# Patient Record
Sex: FEMALE | Race: OTHER | Employment: FULL TIME | URBAN - METROPOLITAN AREA
[De-identification: names, ages, dates, MRNs, and addresses within clinical notes are randomized per-mention and may not be internally consistent; named-entity substitution may affect disease eponyms.]

---

## 2023-05-15 SDOH — HEALTH STABILITY: PHYSICAL HEALTH: ON AVERAGE, HOW MANY MINUTES DO YOU ENGAGE IN EXERCISE AT THIS LEVEL?: 20 MIN

## 2023-05-15 SDOH — HEALTH STABILITY: PHYSICAL HEALTH: ON AVERAGE, HOW MANY DAYS PER WEEK DO YOU ENGAGE IN MODERATE TO STRENUOUS EXERCISE (LIKE A BRISK WALK)?: 3 DAYS

## 2023-05-15 ASSESSMENT — SOCIAL DETERMINANTS OF HEALTH (SDOH)
WITHIN THE LAST YEAR, HAVE YOU BEEN KICKED, HIT, SLAPPED, OR OTHERWISE PHYSICALLY HURT BY YOUR PARTNER OR EX-PARTNER?: NO
WITHIN THE LAST YEAR, HAVE YOU BEEN AFRAID OF YOUR PARTNER OR EX-PARTNER?: NO
WITHIN THE LAST YEAR, HAVE YOU BEEN HUMILIATED OR EMOTIONALLY ABUSED IN OTHER WAYS BY YOUR PARTNER OR EX-PARTNER?: NO
WITHIN THE LAST YEAR, HAVE TO BEEN RAPED OR FORCED TO HAVE ANY KIND OF SEXUAL ACTIVITY BY YOUR PARTNER OR EX-PARTNER?: NO

## 2023-05-16 ENCOUNTER — OFFICE VISIT (OUTPATIENT)
Age: 48
End: 2023-05-16

## 2023-05-16 VITALS — WEIGHT: 198.8 LBS | BODY MASS INDEX: 39.03 KG/M2 | HEIGHT: 60 IN

## 2023-05-16 DIAGNOSIS — M75.02 ADHESIVE CAPSULITIS OF LEFT SHOULDER: Primary | ICD-10-CM

## 2023-05-16 DIAGNOSIS — G89.29 CHRONIC LEFT SHOULDER PAIN: ICD-10-CM

## 2023-05-16 DIAGNOSIS — M25.512 CHRONIC LEFT SHOULDER PAIN: ICD-10-CM

## 2023-05-16 RX ORDER — PRAZOSIN HYDROCHLORIDE 1 MG/1
3 CAPSULE ORAL
COMMUNITY
Start: 2023-04-21

## 2023-05-16 RX ORDER — GABAPENTIN 300 MG/1
300 CAPSULE ORAL
COMMUNITY
Start: 2023-05-12

## 2023-05-16 RX ORDER — TOPIRAMATE 50 MG/1
50 TABLET, FILM COATED ORAL
COMMUNITY
Start: 2022-08-10

## 2023-05-16 RX ORDER — ATORVASTATIN CALCIUM 40 MG/1
40 TABLET, FILM COATED ORAL
COMMUNITY
Start: 2023-04-18

## 2023-05-16 RX ORDER — AZELASTINE 1 MG/ML
SPRAY, METERED NASAL
COMMUNITY
Start: 2022-04-22

## 2023-05-16 RX ORDER — CENEGERMIN-BKBJ 20 UG/ML
SOLUTION/ DROPS OPHTHALMIC
COMMUNITY
Start: 2022-02-28

## 2023-05-16 RX ORDER — POLYETHYLENE GLYCOL 3350 17 G/17G
POWDER, FOR SOLUTION ORAL
COMMUNITY
Start: 2023-04-18

## 2023-05-16 RX ORDER — CARBOXYMETHYLCELLULOSE SODIUM 10 MG/ML
GEL OPHTHALMIC
COMMUNITY
Start: 2022-11-03 | End: 2023-11-04

## 2023-05-16 RX ORDER — ASPIRIN 81 MG/1
81 TABLET, CHEWABLE ORAL
COMMUNITY
Start: 2023-04-18

## 2023-05-16 RX ORDER — HYDROXYZINE HYDROCHLORIDE 25 MG/1
25 TABLET, FILM COATED ORAL
COMMUNITY
Start: 2022-10-21

## 2023-05-16 RX ORDER — DULOXETIN HYDROCHLORIDE 60 MG/1
60 CAPSULE, DELAYED RELEASE ORAL
COMMUNITY
Start: 2022-09-28

## 2023-05-16 RX ORDER — TRAZODONE HYDROCHLORIDE 100 MG/1
100 TABLET ORAL
COMMUNITY
Start: 2023-04-21

## 2023-05-16 RX ORDER — MELOXICAM 15 MG/1
TABLET ORAL
COMMUNITY
Start: 2022-08-11 | End: 2023-08-12

## 2023-05-16 RX ORDER — CLOTRIMAZOLE 1 %
CREAM (GRAM) TOPICAL
COMMUNITY
Start: 2022-10-21

## 2023-05-16 RX ORDER — MONTELUKAST SODIUM 10 MG/1
1 TABLET ORAL DAILY
COMMUNITY
Start: 2023-04-18

## 2023-05-16 NOTE — PROGRESS NOTES
Michelle Shah  1975   Chief Complaint   Patient presents with    Shoulder Pain     Lt         HISTORY OF PRESENT ILLNESS  Michelle Shah is a 52 y.o. female who presents today for evaluation of left shoulder pain. Pain is a 8/10. Pain has been present for awhile, for 20 years. She was in the [facility] and injured the shoulder while repelling years ago. She tried attending 3 rounds of PT, PRP injections and cortisone injections without relief. No previous surgeries on the left shoulder. Presents today ambulating with a cane. Has pain with reaching, with certain movements. Cannot lay on her left side. She also notes numbness in her arm and neck. Has tried following treatments: Injections:Yes; Brace:No; Therapy:Yes; Cane/Crutch:Yes      Allergies   Allergen Reactions    Latex     Amoxicillin Anaphylaxis, Rash, Shortness Of Breath and Swelling     Reaction(s): Anaphylaxis, Willoughby-Luan Syndrome; Note: REACTION UPON INGESTION      Fluogen [Influenza Virus Vaccine]      Reaction(s): Fever, Urticaria, Vomiting, Other: EXEMPT FROM FLU VACCINE      Influenza Vaccine Live      Reaction(s): Angioedema, Urticaria      Naproxen Diarrhea and Nausea And Vomiting        No past medical history on file. Social History    None        No past surgical history on file. No family history on file.   Current Outpatient Medications   Medication Sig    aspirin 81 MG chewable tablet 1 tablet    atorvastatin (LIPITOR) 40 MG tablet 1 tablet    azelastine (ASTELIN) 0.1 % nasal spray     betamethasone valerate (VALISONE) 0.1 % ointment APPLY A SMALL AMOUNT TO AFFECTED AREA 2 TIMES A DAY AS NEEDED FOR RASH ON LEGS, ARM, OR TORSO; NO FACE USE    carboxymethylcellulose (THERATEARS) 1 % ophthalmic gel APPLY 1 DROP TO BOTH EYES AT BEDTIME AS NEEDED FOR DRY EYES    Cenegermin-bkbj (OXERVATE) 0.002 % SOLN     clotrimazole (LOTRIMIN) 1 % cream APPLY A SMALL AMOUNT TO AFFECTED AREA 2 TIMES A DAY FUNGAL RASH UNDER BREASTS

## 2023-05-17 SDOH — HEALTH STABILITY: PHYSICAL HEALTH: ON AVERAGE, HOW MANY MINUTES DO YOU ENGAGE IN EXERCISE AT THIS LEVEL?: 20 MIN

## 2023-05-17 SDOH — HEALTH STABILITY: PHYSICAL HEALTH: ON AVERAGE, HOW MANY DAYS PER WEEK DO YOU ENGAGE IN MODERATE TO STRENUOUS EXERCISE (LIKE A BRISK WALK)?: 3 DAYS

## 2023-05-19 ENCOUNTER — OFFICE VISIT (OUTPATIENT)
Age: 48
End: 2023-05-19

## 2023-05-19 ENCOUNTER — HOSPITAL ENCOUNTER (OUTPATIENT)
Facility: HOSPITAL | Age: 48
Discharge: HOME | End: 2023-05-19
Payer: OTHER GOVERNMENT

## 2023-05-19 VITALS — BODY MASS INDEX: 38.87 KG/M2 | HEIGHT: 60 IN | WEIGHT: 198 LBS

## 2023-05-19 DIAGNOSIS — M25.561 RIGHT KNEE PAIN, UNSPECIFIED CHRONICITY: ICD-10-CM

## 2023-05-19 DIAGNOSIS — M17.11 UNILATERAL PRIMARY OSTEOARTHRITIS, RIGHT KNEE: ICD-10-CM

## 2023-05-19 DIAGNOSIS — M25.562 LEFT KNEE PAIN, UNSPECIFIED CHRONICITY: Primary | ICD-10-CM

## 2023-05-19 DIAGNOSIS — M17.12 UNILATERAL PRIMARY OSTEOARTHRITIS, LEFT KNEE: ICD-10-CM

## 2023-05-19 LAB
BASOPHILS # BLD: 0 K/UL (ref 0–0.1)
BASOPHILS NFR BLD: 1 % (ref 0–2)
CRP SERPL-MCNC: <0.3 MG/DL (ref 0–0.3)
DIFFERENTIAL METHOD BLD: NORMAL
EOSINOPHIL # BLD: 0.1 K/UL (ref 0–0.4)
EOSINOPHIL NFR BLD: 1 % (ref 0–5)
ERYTHROCYTE [DISTWIDTH] IN BLOOD BY AUTOMATED COUNT: 13.6 % (ref 11.6–14.5)
ERYTHROCYTE [SEDIMENTATION RATE] IN BLOOD: 22 MM/HR (ref 0–20)
HCT VFR BLD AUTO: 42.1 % (ref 35–45)
HGB BLD-MCNC: 13.5 G/DL (ref 12–16)
IMM GRANULOCYTES # BLD AUTO: 0 K/UL (ref 0–0.04)
IMM GRANULOCYTES NFR BLD AUTO: 0 % (ref 0–0.5)
LYMPHOCYTES # BLD: 2.4 K/UL (ref 0.9–3.6)
LYMPHOCYTES NFR BLD: 28 % (ref 21–52)
MCH RBC QN AUTO: 29.1 PG (ref 24–34)
MCHC RBC AUTO-ENTMCNC: 32.1 G/DL (ref 31–37)
MCV RBC AUTO: 90.7 FL (ref 78–100)
MONOCYTES # BLD: 0.6 K/UL (ref 0.05–1.2)
MONOCYTES NFR BLD: 7 % (ref 3–10)
NEUTS SEG # BLD: 5.3 K/UL (ref 1.8–8)
NEUTS SEG NFR BLD: 63 % (ref 40–73)
NRBC # BLD: 0 K/UL (ref 0–0.01)
NRBC BLD-RTO: 0 PER 100 WBC
PLATELET # BLD AUTO: 285 K/UL (ref 135–420)
PMV BLD AUTO: 11.6 FL (ref 9.2–11.8)
RBC # BLD AUTO: 4.64 M/UL (ref 4.2–5.3)
RHEUMATOID FACT SERPL-ACNC: <10 IU/ML
URATE SERPL-MCNC: 5.1 MG/DL (ref 2.6–7.2)
WBC # BLD AUTO: 8.4 K/UL (ref 4.6–13.2)

## 2023-05-19 PROCEDURE — 86225 DNA ANTIBODY NATIVE: CPT

## 2023-05-19 PROCEDURE — 85652 RBC SED RATE AUTOMATED: CPT

## 2023-05-19 PROCEDURE — 86235 NUCLEAR ANTIGEN ANTIBODY: CPT

## 2023-05-19 PROCEDURE — 84550 ASSAY OF BLOOD/URIC ACID: CPT

## 2023-05-19 PROCEDURE — 86140 C-REACTIVE PROTEIN: CPT

## 2023-05-19 PROCEDURE — 86431 RHEUMATOID FACTOR QUANT: CPT

## 2023-05-19 PROCEDURE — 85025 COMPLETE CBC W/AUTO DIFF WBC: CPT

## 2023-05-19 PROCEDURE — 86617 LYME DISEASE ANTIBODY: CPT

## 2023-05-19 PROCEDURE — 36415 COLL VENOUS BLD VENIPUNCTURE: CPT

## 2023-05-19 NOTE — PROGRESS NOTES
Stress: Not on file   Social Connections: Not on file   Intimate Partner Violence: Not At Risk    Fear of Current or Ex-Partner: No    Emotionally Abused: No    Physically Abused: No    Sexually Abused: No   Housing Stability: Not on file       History reviewed. No pertinent surgical history. Patient seen evaluated today for pain advice regarding bilateral knee pain. She reports about a 10-year history of knee pain. 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 She does have trouble getting from a chair. She has trouble stairs. She is unable to kneel. She has decreased walking tolerance. She does have episodes where the knee wants to give way on her bilaterally. She takes Mobic on a daily basis. She does have some back problems and takes Neurontin for this. Patient denies recent fevers, chills, chest pain, SOB, or injuries. No recent systemic changes noted. A 12-point review of systems is performed today. Pertinent positives are noted. All other systems reviewed and otherwise are negative. Physical exam: General: Alert and oriented x3, nad.  well-developed, well nourished. normal affect, AF. NC/AT, EOMI, neck supple, trachea midline, no JVD present. Breathing is non-labored. Examination of the lower extremities reveals pain-free range of motion of the hips. There is no pain to palpation the trochanter bursa. Negative straight leg raise. Negative calf tenderness. Negative Homans. No signs of DVT present. Each of the knees reveal skin intact. There is no erythema or ecchymosis noted. She has minimal effusion with negative patella ballottement. She does have pain to palpation tricompartmentally about the knees and crepitus arising into compartment. She does have a click medially bilaterally with nAjelica's. Ligamentously stable.     Radiographs obtained in office today 5/19/2023 at the [facility] location including AP, tunnel, lateral, skyline

## 2023-05-20 LAB
CENTROMERE B AB SER-ACNC: <0.2 AI (ref 0–0.9)
CHROMATIN AB SERPL-ACNC: <0.2 AI (ref 0–0.9)
DSDNA AB SER-ACNC: <1 IU/ML (ref 0–9)
ENA JO1 AB SER-ACNC: <0.2 AI (ref 0–0.9)
ENA RNP AB SER-ACNC: <0.2 AI (ref 0–0.9)
ENA SCL70 AB SER-ACNC: <0.2 AI (ref 0–0.9)
ENA SM AB SER-ACNC: <0.2 AI (ref 0–0.9)
ENA SS-A AB SER-ACNC: <0.2 AI (ref 0–0.9)
ENA SS-B AB SER-ACNC: <0.2 AI (ref 0–0.9)
Lab: NORMAL

## 2023-05-22 ENCOUNTER — TELEPHONE (OUTPATIENT)
Age: 48
End: 2023-05-22

## 2023-05-23 LAB
B BURGDOR IGG PATRN SER IB-IMP: NEGATIVE
B BURGDOR IGM PATRN SER IB-IMP: NEGATIVE
B BURGDOR18KD IGG SER QL IB: ABNORMAL
B BURGDOR23KD IGG SER QL IB: ABNORMAL
B BURGDOR23KD IGM SER QL IB: ABNORMAL
B BURGDOR28KD IGG SER QL IB: ABNORMAL
B BURGDOR30KD IGG SER QL IB: ABNORMAL
B BURGDOR39KD IGG SER QL IB: ABNORMAL
B BURGDOR39KD IGM SER QL IB: ABNORMAL
B BURGDOR41KD IGG SER QL IB: PRESENT
B BURGDOR41KD IGM SER QL IB: ABNORMAL
B BURGDOR45KD IGG SER QL IB: ABNORMAL
B BURGDOR58KD IGG SER QL IB: ABNORMAL
B BURGDOR66KD IGG SER QL IB: ABNORMAL
B BURGDOR93KD IGG SER QL IB: ABNORMAL

## 2023-06-02 ENCOUNTER — HOSPITAL ENCOUNTER (OUTPATIENT)
Facility: HOSPITAL | Age: 48
Discharge: HOME | End: 2023-06-02
Payer: OTHER GOVERNMENT

## 2023-06-02 ENCOUNTER — HOSPITAL ENCOUNTER (OUTPATIENT)
Facility: HOSPITAL | Age: 48
End: 2023-06-02
Payer: OTHER GOVERNMENT

## 2023-06-02 DIAGNOSIS — M25.561 RIGHT KNEE PAIN, UNSPECIFIED CHRONICITY: ICD-10-CM

## 2023-06-02 DIAGNOSIS — M25.562 LEFT KNEE PAIN, UNSPECIFIED CHRONICITY: ICD-10-CM

## 2023-06-02 PROCEDURE — 73721 MRI JNT OF LWR EXTRE W/O DYE: CPT

## 2023-06-08 ENCOUNTER — OFFICE VISIT (OUTPATIENT)
Age: 48
End: 2023-06-08

## 2023-06-08 VITALS — HEIGHT: 60 IN | RESPIRATION RATE: 18 BRPM | WEIGHT: 198 LBS | BODY MASS INDEX: 38.87 KG/M2

## 2023-06-08 DIAGNOSIS — M25.362 KNEE GIVES WAY, LEFT: ICD-10-CM

## 2023-06-08 DIAGNOSIS — M17.12 UNILATERAL PRIMARY OSTEOARTHRITIS, LEFT KNEE: ICD-10-CM

## 2023-06-08 DIAGNOSIS — M25.361 KNEE GIVES WAY, RIGHT: ICD-10-CM

## 2023-06-08 DIAGNOSIS — M17.11 UNILATERAL PRIMARY OSTEOARTHRITIS, RIGHT KNEE: Primary | ICD-10-CM

## 2023-06-08 DIAGNOSIS — M25.562 PAIN IN BOTH KNEES, UNSPECIFIED CHRONICITY: ICD-10-CM

## 2023-06-08 DIAGNOSIS — M25.561 PAIN IN BOTH KNEES, UNSPECIFIED CHRONICITY: ICD-10-CM

## 2023-06-08 NOTE — PROGRESS NOTES
mechanical symptomatology and pain I will refer her over the sports medicine gas for further evaluation to see if arthroscopy would be beneficial for her. She will continue activities as tolerated. She will call with any questions or concerns that shall arise.             JR Christian LEONG, PAJorgeC, ATC

## 2023-07-07 ENCOUNTER — TELEPHONE (OUTPATIENT)
Age: 48
End: 2023-07-07

## 2023-07-07 NOTE — TELEPHONE ENCOUNTER
Call received from Frank Maloney at [facility] stating the patient ABBOTT [facility]) was referred to them, and authorization/referral is needed before the patient can be seen. Frank Maloney is asking for a call back, and can be reached at [phone].

## 2023-07-11 ENCOUNTER — TELEPHONE (OUTPATIENT)
Age: 48
End: 2023-07-11

## 2023-07-11 NOTE — TELEPHONE ENCOUNTER
Patient called and said that Emma Moctezuma referred her to [facility] in [facility] for her Left Shoulder. Patient said she called [facility] and they told her that they have a closer location for her that she can go to. They told her that she could go to the [facility] in [place]. Patient said she went to the [facility] in [place]. That they saw her for left shoulder. Patient said that afterwards she found out that her insurance , [employer] will not pay for the visit to [facility] , due to [facility] office never asked the [address] [place] [address] for their approval.     Patient said that [facility] office can not refer the patient to anyone else without their approval first. That the [address] [place] [address] will not pay for the [facility] appt she had. So she is now stuck with the bill. Patient said that she wants to continue to get treated for her left shoulder at [facility], but she needs  office to get approval from [facility],so that she may continue going to [facility] in [place]. Patient said that [facility] office needs to place a request for her to see Ron Coyne at [facility] in [place]. [phone]. Patient is upset that [facility] office did not get the approval first.    Patient tel. [phone].

## 2023-07-14 ENCOUNTER — OFFICE VISIT (OUTPATIENT)
Age: 48
End: 2023-07-14

## 2023-07-14 VITALS — BODY MASS INDEX: 39.5 KG/M2 | WEIGHT: 201.2 LBS | HEIGHT: 60 IN

## 2023-07-14 DIAGNOSIS — M23.92 INTERNAL DERANGEMENT OF LEFT KNEE: Primary | ICD-10-CM

## 2023-07-14 DIAGNOSIS — E66.9 CLASS 2 OBESITY WITH BODY MASS INDEX (BMI) OF 39.0 TO 39.9 IN ADULT, UNSPECIFIED OBESITY TYPE, UNSPECIFIED WHETHER SERIOUS COMORBIDITY PRESENT: ICD-10-CM

## 2023-07-14 DIAGNOSIS — M23.91 INTERNAL DERANGEMENT OF RIGHT KNEE: ICD-10-CM

## 2023-07-14 PROBLEM — E66.812 CLASS 2 OBESITY WITH BODY MASS INDEX (BMI) OF 39.0 TO 39.9 IN ADULT: Status: ACTIVE | Noted: 2023-07-14

## 2023-07-14 RX ORDER — TRIAMCINOLONE ACETONIDE 40 MG/ML
80 INJECTION, SUSPENSION INTRA-ARTICULAR; INTRAMUSCULAR ONCE
Status: COMPLETED | OUTPATIENT
Start: 2023-07-14 | End: 2023-07-14

## 2023-07-14 RX ADMIN — TRIAMCINOLONE ACETONIDE 80 MG: 40 INJECTION, SUSPENSION INTRA-ARTICULAR; INTRAMUSCULAR at 09:54

## 2023-07-14 NOTE — PROGRESS NOTES
TIMES A DAY ** PLEASE USE DICLOFENAC DOSING CARD FOR ADMINISTRATION** TO PAINFUL JOINT/BACK      DULoxetine (CYMBALTA) 60 MG extended release capsule 1 capsule      gabapentin (NEURONTIN) 300 MG capsule 1 capsule. hydrOXYzine HCl (ATARAX) 25 MG tablet 1 tablet      meloxicam (MOBIC) 15 MG tablet TAKE ONE TABLET BY MOUTH EVERY DAY AS NEEDED FOR PAIN      montelukast (SINGULAIR) 10 MG tablet Take 1 tablet by mouth daily      polyethylene glycol (GLYCOLAX) 17 GM/SCOOP powder TAKE 1 CAPFUL (17 GRAMS) BY MOUTH EVERY DAY -DISSOLVE EACH DOSE IN AT LEAST 8 OUNCES OF WATER, JUICE, SODA OR COFFEE      Polyvinyl Alcohol-Povidone PF (REFRESH) 1.4-0.6 % SOLN ophthalmic solution INSTILL 2 DROPS IN BOTH EYES SIX TIMES A DAY FOR DRY EYES      prazosin (MINIPRESS) 1 MG capsule 3 capsules      topiramate (TOPAMAX) 50 MG tablet 1 tablet      traZODone (DESYREL) 100 MG tablet 1 tablet       Current Facility-Administered Medications   Medication Dose Route Frequency Provider Last Rate Last Admin    triamcinolone acetonide (KENALOG-40) injection 80 mg  80 mg IntraMUSCular Once Tai Clay DO        triamcinolone acetonide (KENALOG-40) injection 80 mg  80 mg IntraMUSCular Once Speedy Tarango,             Allergies   Allergen Reactions    Latex     Amoxicillin Anaphylaxis, Rash, Shortness Of Breath and Swelling     Reaction(s): Anaphylaxis, Willoughby-Luan Syndrome; Note: REACTION UPON INGESTION      Fluogen [Influenza Virus Vaccine]      Reaction(s): Fever, Urticaria, Vomiting, Other: EXEMPT FROM FLU VACCINE      Influenza Vaccine Live      Reaction(s): Angioedema, Urticaria      Naproxen Diarrhea and Nausea And Vomiting       No past surgical history on file.     Social History     Socioeconomic History    Marital status:      Spouse name: Not on file    Number of children: Not on file    Years of education: Not on file    Highest education level: Not on file   Occupational History    Not on file   Tobacco Use

## 2023-07-14 NOTE — ASSESSMENT & PLAN NOTE
49-year-old female with bilateral knee pain. This has been present for many years. She also has bilateral hip pain as well as back pain as well as sciatica. She has had injections into her back which has given transient relief of her back and sciatic symptoms but has not helped her hip or knees. She has had bursa injections in her hips which did give her help. None of these is helped her knee pain. Imaging does not show advanced arthritis on the x-ray or MRI. There is some signal in the medial menisci but no distinct tearing, patient denies any locking or catching. She has been tested for rheumatoid arthritis and lupus and all that testing is negative. On physical exam there is minimal crepitus behind the kneecaps and no J sign bilaterally. She has excellent mobility. She is tried multiple medications including Cymbalta, Mobic, Tylenol, Voltaren gel, gabapentin, tramadol without much relief. She is also going 6 weeks of physical therapy without relief. At this point I am not entirely sure what is exactly causing her so much pain. I will try bilateral cortisone injections into the knees today to help diagnose that her pain is intra-articular or if this referred type pain. If those help we can discuss repeating them in the future or moving onto gel injections. If none of these work may consider diagnostic arthroscopy. After obtaining written consent for bilateral knee intra-articular injection the patient was prepped in normal sterile fashion with freeze spray and alcohol. 80mg kenalog and 2mL 2% lidocaine was injected . The needle was withdrawn, the area was cleansed and a sterile bandage was applied. The patient tolerated the procedure well.

## 2023-09-12 ENCOUNTER — OFFICE VISIT (OUTPATIENT)
Age: 48
End: 2023-09-12
Payer: OTHER GOVERNMENT

## 2023-09-12 VITALS — BODY MASS INDEX: 40.05 KG/M2 | WEIGHT: 204 LBS | HEIGHT: 60 IN

## 2023-09-12 DIAGNOSIS — E66.9 CLASS 2 OBESITY WITH BODY MASS INDEX (BMI) OF 39.0 TO 39.9 IN ADULT, UNSPECIFIED OBESITY TYPE, UNSPECIFIED WHETHER SERIOUS COMORBIDITY PRESENT: ICD-10-CM

## 2023-09-12 DIAGNOSIS — M23.92 INTERNAL DERANGEMENT OF LEFT KNEE: Primary | ICD-10-CM

## 2023-09-12 DIAGNOSIS — M23.91 INTERNAL DERANGEMENT OF RIGHT KNEE: ICD-10-CM

## 2023-09-12 PROCEDURE — 99213 OFFICE O/P EST LOW 20 MIN: CPT | Performed by: ORTHOPAEDIC SURGERY

## 2023-09-12 NOTE — PROGRESS NOTES
LEGS, ARM, OR TORSO; NO FACE USE      carboxymethylcellulose (THERATEARS) 1 % ophthalmic gel APPLY 1 DROP TO BOTH EYES AT BEDTIME AS NEEDED FOR DRY EYES      Cenegermin-bkbj (OXERVATE) 0.002 % SOLN       clotrimazole (LOTRIMIN) 1 % cream APPLY A SMALL AMOUNT TO AFFECTED AREA 2 TIMES A DAY FUNGAL RASH UNDER BREASTS      diclofenac sodium (VOLTAREN) 1 % GEL APPLY FOUR GRAMS TO INTACT SKIN OF PAINFUL AFFECTED AREA FOUR TIMES A DAY ** PLEASE USE DICLOFENAC DOSING CARD FOR ADMINISTRATION** TO PAINFUL JOINT/BACK      DULoxetine (CYMBALTA) 60 MG extended release capsule 1 capsule      gabapentin (NEURONTIN) 300 MG capsule 1 capsule. hydrOXYzine HCl (ATARAX) 25 MG tablet 1 tablet      montelukast (SINGULAIR) 10 MG tablet Take 1 tablet by mouth daily      polyethylene glycol (GLYCOLAX) 17 GM/SCOOP powder TAKE 1 CAPFUL (17 GRAMS) BY MOUTH EVERY DAY -DISSOLVE EACH DOSE IN AT LEAST 8 OUNCES OF WATER, JUICE, SODA OR COFFEE      Polyvinyl Alcohol-Povidone PF (REFRESH) 1.4-0.6 % SOLN ophthalmic solution INSTILL 2 DROPS IN BOTH EYES SIX TIMES A DAY FOR DRY EYES      prazosin (MINIPRESS) 1 MG capsule 3 capsules      topiramate (TOPAMAX) 50 MG tablet 1 tablet      traZODone (DESYREL) 100 MG tablet 1 tablet      meloxicam (MOBIC) 15 MG tablet TAKE ONE TABLET BY MOUTH EVERY DAY AS NEEDED FOR PAIN       No current facility-administered medications for this visit. Allergies   Allergen Reactions    Latex     Amoxicillin Anaphylaxis, Rash, Shortness Of Breath and Swelling     Reaction(s): Anaphylaxis, Willoughby-Luan Syndrome; Note: REACTION UPON INGESTION      Fluogen [Influenza Virus Vaccine]      Reaction(s): Fever, Urticaria, Vomiting, Other: EXEMPT FROM FLU VACCINE      Influenza Vaccine Live      Reaction(s): Angioedema, Urticaria      Naproxen Diarrhea and Nausea And Vomiting       No past surgical history on file.     Social History     Socioeconomic History    Marital status:      Spouse name: Not on file

## 2023-10-30 ENCOUNTER — OFFICE VISIT (OUTPATIENT)
Age: 48
End: 2023-10-30

## 2023-10-30 VITALS — HEIGHT: 60 IN | BODY MASS INDEX: 39.58 KG/M2 | WEIGHT: 201.6 LBS

## 2023-10-30 DIAGNOSIS — E66.9 CLASS 2 OBESITY WITH BODY MASS INDEX (BMI) OF 39.0 TO 39.9 IN ADULT, UNSPECIFIED OBESITY TYPE, UNSPECIFIED WHETHER SERIOUS COMORBIDITY PRESENT: ICD-10-CM

## 2023-10-30 DIAGNOSIS — M17.12 PRIMARY OSTEOARTHRITIS OF LEFT KNEE: Primary | ICD-10-CM

## 2023-10-30 DIAGNOSIS — M17.11 PRIMARY OSTEOARTHRITIS OF RIGHT KNEE: ICD-10-CM

## 2023-10-30 RX ORDER — HYALURONATE SODIUM 10 MG/ML
20 SYRINGE (ML) INTRAARTICULAR ONCE
Status: COMPLETED | OUTPATIENT
Start: 2023-10-30 | End: 2023-10-30

## 2023-10-30 RX ADMIN — Medication 20 MG: at 16:13

## 2023-10-30 RX ADMIN — Medication 20 MG: at 16:14

## 2023-10-30 NOTE — ASSESSMENT & PLAN NOTE
After obtaining written consent for bilateral knee intra-articular injection the patient was prepped in normal sterile fashion with freeze spray and alcohol. 2mL Euflexxa (1% sodium Hyaluronate) was injected with ultrasound guidance. The needle was withdrawn, the area was cleansed and a sterile bandage was applied. The patient tolerated the procedure well.

## 2023-11-09 ENCOUNTER — OFFICE VISIT (OUTPATIENT)
Age: 48
End: 2023-11-09

## 2023-11-09 VITALS — BODY MASS INDEX: 39.46 KG/M2 | WEIGHT: 201 LBS | HEIGHT: 60 IN

## 2023-11-09 DIAGNOSIS — E66.9 CLASS 2 OBESITY WITH BODY MASS INDEX (BMI) OF 39.0 TO 39.9 IN ADULT, UNSPECIFIED OBESITY TYPE, UNSPECIFIED WHETHER SERIOUS COMORBIDITY PRESENT: ICD-10-CM

## 2023-11-09 DIAGNOSIS — M17.12 PRIMARY OSTEOARTHRITIS OF LEFT KNEE: Primary | ICD-10-CM

## 2023-11-09 DIAGNOSIS — M17.11 PRIMARY OSTEOARTHRITIS OF RIGHT KNEE: ICD-10-CM

## 2023-11-09 RX ORDER — HYALURONATE SODIUM 10 MG/ML
20 SYRINGE (ML) INTRAARTICULAR ONCE
Status: COMPLETED | OUTPATIENT
Start: 2023-11-09 | End: 2023-11-09

## 2023-11-09 RX ADMIN — Medication 20 MG: at 15:58

## 2023-11-09 RX ADMIN — Medication 20 MG: at 15:59

## 2023-11-13 ENCOUNTER — OFFICE VISIT (OUTPATIENT)
Age: 48
End: 2023-11-13

## 2023-11-13 VITALS — BODY MASS INDEX: 39.27 KG/M2 | HEIGHT: 60 IN | WEIGHT: 200 LBS

## 2023-11-13 DIAGNOSIS — E66.9 CLASS 2 OBESITY WITH BODY MASS INDEX (BMI) OF 39.0 TO 39.9 IN ADULT, UNSPECIFIED OBESITY TYPE, UNSPECIFIED WHETHER SERIOUS COMORBIDITY PRESENT: ICD-10-CM

## 2023-11-13 DIAGNOSIS — M23.92 INTERNAL DERANGEMENT OF LEFT KNEE: ICD-10-CM

## 2023-11-13 DIAGNOSIS — M23.91 INTERNAL DERANGEMENT OF RIGHT KNEE: Primary | ICD-10-CM

## 2023-11-13 RX ORDER — HYALURONATE SODIUM 10 MG/ML
20 SYRINGE (ML) INTRAARTICULAR ONCE
Status: COMPLETED | OUTPATIENT
Start: 2023-11-13 | End: 2023-11-13

## 2023-11-13 RX ADMIN — Medication 20 MG: at 10:38

## 2023-11-13 NOTE — PROGRESS NOTES
status: Never     Passive exposure: Never    Smokeless tobacco: Never   Substance and Sexual Activity    Alcohol use: Not on file    Drug use: Not on file    Sexual activity: Not on file   Other Topics Concern    Not on file   Social History Narrative    Not on file     Social Determinants of Health     Financial Resource Strain: Not on file   Food Insecurity: Not on file   Transportation Needs: Not on file   Physical Activity: Insufficiently Active (5/17/2023)    Exercise Vital Sign     Days of Exercise per Week: 3 days     Minutes of Exercise per Session: 20 min   Stress: Not on file   Social Connections: Not on file   Intimate Partner Violence: Not At Risk (5/17/2023)    Humiliation, Afraid, Rape, and Kick questionnaire     Fear of Current or Ex-Partner: No     Emotionally Abused: No     Physically Abused: No     Sexually Abused: No   Housing Stability: Not on file       REVIEW OF SYSTEMS:      Negative except for that stated above. [unfilled]      Prescription medication management discussed with patient. Electronically signed by: Clarene Meckel, DO    Note: This note was completed using voice recognition software.   Any typographical/name errors or mistakes are unintentional.

## 2023-12-23 ENCOUNTER — HOSPITAL ENCOUNTER (OUTPATIENT)
Facility: HOSPITAL | Age: 48
Discharge: HOME | End: 2023-12-26
Payer: OTHER GOVERNMENT

## 2023-12-23 DIAGNOSIS — H53.9 VISUAL DISTURBANCE: ICD-10-CM

## 2023-12-23 DIAGNOSIS — H53.9 UNSPECIFIED VISUAL DISTURBANCE: ICD-10-CM

## 2023-12-23 PROCEDURE — 70553 MRI BRAIN STEM W/O & W/DYE: CPT

## 2023-12-23 PROCEDURE — 70543 MRI ORBT/FAC/NCK W/O &W/DYE: CPT

## 2023-12-23 PROCEDURE — 6360000004 HC RX CONTRAST MEDICATION: Performed by: OPTOMETRIST

## 2023-12-23 PROCEDURE — A9577 INJ MULTIHANCE: HCPCS | Performed by: OPTOMETRIST

## 2023-12-23 RX ADMIN — GADOBENATE DIMEGLUMINE 20 ML: 529 INJECTION, SOLUTION INTRAVENOUS at 15:32

## 2024-03-27 ENCOUNTER — HOSPITAL ENCOUNTER (OUTPATIENT)
Facility: HOSPITAL | Age: 49
Discharge: HOME | End: 2024-03-30
Payer: OTHER GOVERNMENT

## 2024-03-27 DIAGNOSIS — Z01.818 PRE-OP TESTING: ICD-10-CM

## 2024-03-27 LAB
ANION GAP SERPL CALC-SCNC: 5 MMOL/L (ref 3–18)
BASOPHILS # BLD: 0 K/UL (ref 0–0.1)
BASOPHILS NFR BLD: 0 % (ref 0–2)
BUN SERPL-MCNC: 19 MG/DL (ref 7–18)
BUN/CREAT SERPL: 20 (ref 12–20)
CALCIUM SERPL-MCNC: 9.6 MG/DL (ref 8.5–10.1)
CHLORIDE SERPL-SCNC: 111 MMOL/L (ref 100–111)
CO2 SERPL-SCNC: 26 MMOL/L (ref 21–32)
CREAT SERPL-MCNC: 0.94 MG/DL (ref 0.6–1.3)
DIFFERENTIAL METHOD BLD: NORMAL
EOSINOPHIL # BLD: 0.3 K/UL (ref 0–0.4)
EOSINOPHIL NFR BLD: 3 % (ref 0–5)
ERYTHROCYTE [DISTWIDTH] IN BLOOD BY AUTOMATED COUNT: 13 % (ref 11.6–14.5)
EST. AVERAGE GLUCOSE BLD GHB EST-MCNC: 117 MG/DL
GLUCOSE SERPL-MCNC: 101 MG/DL (ref 74–99)
HBA1C MFR BLD: 5.7 % (ref 4.2–5.6)
HCT VFR BLD AUTO: 40.3 % (ref 35–45)
HGB BLD-MCNC: 13.3 G/DL (ref 12–16)
IMM GRANULOCYTES # BLD AUTO: 0 K/UL (ref 0–0.04)
IMM GRANULOCYTES NFR BLD AUTO: 0 % (ref 0–0.5)
LYMPHOCYTES # BLD: 2.2 K/UL (ref 0.9–3.6)
LYMPHOCYTES NFR BLD: 27 % (ref 21–52)
MCH RBC QN AUTO: 29.1 PG (ref 24–34)
MCHC RBC AUTO-ENTMCNC: 33 G/DL (ref 31–37)
MCV RBC AUTO: 88.2 FL (ref 78–100)
MONOCYTES # BLD: 0.6 K/UL (ref 0.05–1.2)
MONOCYTES NFR BLD: 7 % (ref 3–10)
NEUTS SEG # BLD: 4.9 K/UL (ref 1.8–8)
NEUTS SEG NFR BLD: 62 % (ref 40–73)
NRBC # BLD: 0 K/UL (ref 0–0.01)
NRBC BLD-RTO: 0 PER 100 WBC
PLATELET # BLD AUTO: 233 K/UL (ref 135–420)
PMV BLD AUTO: 11 FL (ref 9.2–11.8)
POTASSIUM SERPL-SCNC: 4 MMOL/L (ref 3.5–5.5)
RBC # BLD AUTO: 4.57 M/UL (ref 4.2–5.3)
SODIUM SERPL-SCNC: 142 MMOL/L (ref 136–145)
WBC # BLD AUTO: 8 K/UL (ref 4.6–13.2)

## 2024-03-27 PROCEDURE — 83036 HEMOGLOBIN GLYCOSYLATED A1C: CPT

## 2024-03-27 PROCEDURE — 80048 BASIC METABOLIC PNL TOTAL CA: CPT

## 2024-03-27 PROCEDURE — 85025 COMPLETE CBC W/AUTO DIFF WBC: CPT

## 2024-03-27 PROCEDURE — 36415 COLL VENOUS BLD VENIPUNCTURE: CPT

## 2024-04-02 ENCOUNTER — ANESTHESIA EVENT (OUTPATIENT)
Facility: HOSPITAL | Age: 49
End: 2024-04-02
Payer: OTHER GOVERNMENT

## 2024-04-02 RX ORDER — MELOXICAM 15 MG/1
15 TABLET ORAL DAILY
COMMUNITY

## 2024-04-02 RX ORDER — LIDOCAINE 50 MG/G
1 PATCH TOPICAL DAILY
COMMUNITY
Start: 2021-12-20 | End: 2024-05-19

## 2024-04-02 RX ORDER — CYCLOSPORINE 0.5 MG/ML
1 EMULSION OPHTHALMIC EVERY 12 HOURS
COMMUNITY
Start: 2023-10-30

## 2024-04-02 RX ORDER — METHOCARBAMOL 500 MG/1
500 TABLET, FILM COATED ORAL 2 TIMES DAILY
COMMUNITY
Start: 2021-12-20 | End: 2024-06-14

## 2024-04-02 RX ORDER — CARBOXYMETHYLCELLULOSE SODIUM 5 MG/ML
2 SOLUTION/ DROPS OPHTHALMIC 2 TIMES DAILY
COMMUNITY
Start: 2024-01-10

## 2024-04-02 RX ORDER — PREGABALIN 50 MG/1
50 CAPSULE ORAL 2 TIMES DAILY
COMMUNITY
Start: 2024-01-23 | End: 2024-07-25

## 2024-04-02 RX ORDER — ZOLMITRIPTAN 5 MG/1
5 TABLET, ORALLY DISINTEGRATING ORAL DAILY
COMMUNITY
Start: 2021-12-20

## 2024-04-02 RX ORDER — FLUTICASONE PROPIONATE 50 MCG
2 SPRAY, SUSPENSION (ML) NASAL DAILY
COMMUNITY
Start: 2021-07-16 | End: 2024-05-19

## 2024-04-02 RX ORDER — FAMOTIDINE 20 MG/1
40 TABLET, FILM COATED ORAL
COMMUNITY
Start: 2022-07-07 | End: 2024-05-19

## 2024-04-02 NOTE — PERIOP NOTE
Instructions for your surgery at [facility]      Today's Date:  4/2/2024      Patient's Name:  Karma Downs           Surgery Date:  04/03/2024              Please enter the main entrance of the hospital and check-in at the  located in the lobby. Once checked in at the , you will take the elevators to the second floor, and report to the waiting room on the left. The room will say Procedure Registration.    Do NOT eat or drink anything, including candy, gum, or ice chips after midnight prior to your surgery, unless you have specific instructions from your surgeon or anesthesia provider to do so.  Brush your teeth before coming to the hospital. You may swish with water, but do not swallow.  No smoking/Vaping/E-Cigarettes 24 hours prior to the day of surgery.  No alcohol 24 hours prior to the day of surgery.  No recreational drugs for one week prior to the day of surgery.  Bring Photo ID, Insurance information, and Co-pay if required on day of surgery.  Bring in pertinent legal documents, such as, Medical Power of , DNR, Advance Directive, etc.  Leave all valuables, including money/purse, at home.  Remove all jewelry, including ALL body piercings, nail polish, acrylic nails, and makeup (including mascara); no lotions, powders, deodorant, or perfume/cologne/after shave on the skin.  Follow instruction for Hibiclens washes and CHG wipes from surgeon's office.   Glasses and dentures may be worn to the hospital. They must be removed prior to surgery. Please bring case/container for glasses or dentures.   Contact lenses should not be worn on day of surgery.   Call your doctor's office if symptoms of a cold or illness develop within 24-48 hours prior to your surgery.  Call your doctor's office if you have any questions concerning insurance or co-pays.  15. AN ADULT (relative or friend 18 years or older) MUST DRIVE YOU HOME AFTER YOUR SURGERY.  16. Please make

## 2024-04-03 ENCOUNTER — ANESTHESIA (OUTPATIENT)
Facility: HOSPITAL | Age: 49
End: 2024-04-03
Payer: OTHER GOVERNMENT

## 2024-04-03 ENCOUNTER — HOSPITAL ENCOUNTER (OUTPATIENT)
Facility: HOSPITAL | Age: 49
Setting detail: OUTPATIENT SURGERY
Discharge: HOME | End: 2024-04-03
Attending: PODIATRIST | Admitting: PODIATRIST
Payer: OTHER GOVERNMENT

## 2024-04-03 VITALS
HEIGHT: 60 IN | TEMPERATURE: 97.7 F | SYSTOLIC BLOOD PRESSURE: 101 MMHG | WEIGHT: 206 LBS | DIASTOLIC BLOOD PRESSURE: 64 MMHG | HEART RATE: 74 BPM | OXYGEN SATURATION: 96 % | BODY MASS INDEX: 40.44 KG/M2 | RESPIRATION RATE: 16 BRPM

## 2024-04-03 DIAGNOSIS — G89.18 POST-OP PAIN: Primary | ICD-10-CM

## 2024-04-03 LAB — HCG UR QL: NEGATIVE

## 2024-04-03 PROCEDURE — 2580000003 HC RX 258: Performed by: NURSE ANESTHETIST, CERTIFIED REGISTERED

## 2024-04-03 PROCEDURE — C1713 ANCHOR/SCREW BN/BN,TIS/BN: HCPCS | Performed by: PODIATRIST

## 2024-04-03 PROCEDURE — 2720000010 HC SURG SUPPLY STERILE: Performed by: PODIATRIST

## 2024-04-03 PROCEDURE — C1769 GUIDE WIRE: HCPCS | Performed by: PODIATRIST

## 2024-04-03 PROCEDURE — 2709999900 HC NON-CHARGEABLE SUPPLY: Performed by: PODIATRIST

## 2024-04-03 PROCEDURE — 7100000010 HC PHASE II RECOVERY - FIRST 15 MIN: Performed by: PODIATRIST

## 2024-04-03 PROCEDURE — 7100000000 HC PACU RECOVERY - FIRST 15 MIN: Performed by: PODIATRIST

## 2024-04-03 PROCEDURE — 81025 URINE PREGNANCY TEST: CPT

## 2024-04-03 PROCEDURE — 2500000003 HC RX 250 WO HCPCS: Performed by: PODIATRIST

## 2024-04-03 PROCEDURE — 3700000001 HC ADD 15 MINUTES (ANESTHESIA): Performed by: PODIATRIST

## 2024-04-03 PROCEDURE — 6360000002 HC RX W HCPCS: Performed by: PODIATRIST

## 2024-04-03 PROCEDURE — 2580000003 HC RX 258: Performed by: PODIATRIST

## 2024-04-03 PROCEDURE — 3600000012 HC SURGERY LEVEL 2 ADDTL 15MIN: Performed by: PODIATRIST

## 2024-04-03 PROCEDURE — 3700000000 HC ANESTHESIA ATTENDED CARE: Performed by: PODIATRIST

## 2024-04-03 PROCEDURE — 3600000002 HC SURGERY LEVEL 2 BASE: Performed by: PODIATRIST

## 2024-04-03 PROCEDURE — 7100000011 HC PHASE II RECOVERY - ADDTL 15 MIN: Performed by: PODIATRIST

## 2024-04-03 PROCEDURE — 7100000001 HC PACU RECOVERY - ADDTL 15 MIN: Performed by: PODIATRIST

## 2024-04-03 PROCEDURE — 6360000002 HC RX W HCPCS: Performed by: NURSE ANESTHETIST, CERTIFIED REGISTERED

## 2024-04-03 PROCEDURE — 2500000003 HC RX 250 WO HCPCS: Performed by: NURSE ANESTHETIST, CERTIFIED REGISTERED

## 2024-04-03 PROCEDURE — 6370000000 HC RX 637 (ALT 250 FOR IP): Performed by: NURSE ANESTHETIST, CERTIFIED REGISTERED

## 2024-04-03 DEVICE — IMPLANTABLE DEVICE: Type: IMPLANTABLE DEVICE | Site: FOOT | Status: FUNCTIONAL

## 2024-04-03 RX ORDER — CLINDAMYCIN HYDROCHLORIDE 300 MG/1
300 CAPSULE ORAL 3 TIMES DAILY
Qty: 21 CAPSULE | Refills: 0 | Status: SHIPPED | OUTPATIENT
Start: 2024-04-03 | End: 2024-04-10

## 2024-04-03 RX ORDER — NALOXONE HYDROCHLORIDE 0.4 MG/ML
INJECTION, SOLUTION INTRAMUSCULAR; INTRAVENOUS; SUBCUTANEOUS PRN
Status: DISCONTINUED | OUTPATIENT
Start: 2024-04-03 | End: 2024-04-03 | Stop reason: HOSPADM

## 2024-04-03 RX ORDER — PROPOFOL 10 MG/ML
INJECTION, EMULSION INTRAVENOUS PRN
Status: DISCONTINUED | OUTPATIENT
Start: 2024-04-03 | End: 2024-04-03 | Stop reason: SDUPTHER

## 2024-04-03 RX ORDER — ONDANSETRON 2 MG/ML
4 INJECTION INTRAMUSCULAR; INTRAVENOUS
Status: DISCONTINUED | OUTPATIENT
Start: 2024-04-03 | End: 2024-04-03 | Stop reason: HOSPADM

## 2024-04-03 RX ORDER — LIDOCAINE HYDROCHLORIDE 10 MG/ML
1 INJECTION, SOLUTION EPIDURAL; INFILTRATION; INTRACAUDAL; PERINEURAL
Status: DISCONTINUED | OUTPATIENT
Start: 2024-04-03 | End: 2024-04-03 | Stop reason: HOSPADM

## 2024-04-03 RX ORDER — GLYCOPYRROLATE 0.2 MG/ML
INJECTION INTRAMUSCULAR; INTRAVENOUS PRN
Status: DISCONTINUED | OUTPATIENT
Start: 2024-04-03 | End: 2024-04-03 | Stop reason: SDUPTHER

## 2024-04-03 RX ORDER — SODIUM CHLORIDE, SODIUM LACTATE, POTASSIUM CHLORIDE, CALCIUM CHLORIDE 600; 310; 30; 20 MG/100ML; MG/100ML; MG/100ML; MG/100ML
INJECTION, SOLUTION INTRAVENOUS CONTINUOUS
Status: DISCONTINUED | OUTPATIENT
Start: 2024-04-03 | End: 2024-04-03 | Stop reason: HOSPADM

## 2024-04-03 RX ORDER — PHENYLEPHRINE HCL IN 0.9% NACL 1 MG/10 ML
SYRINGE (ML) INTRAVENOUS PRN
Status: DISCONTINUED | OUTPATIENT
Start: 2024-04-03 | End: 2024-04-03 | Stop reason: SDUPTHER

## 2024-04-03 RX ORDER — FAMOTIDINE 20 MG/1
20 TABLET, FILM COATED ORAL ONCE
Status: COMPLETED | OUTPATIENT
Start: 2024-04-03 | End: 2024-04-03

## 2024-04-03 RX ORDER — LORAZEPAM 2 MG/ML
0.5 INJECTION INTRAMUSCULAR
Status: DISCONTINUED | OUTPATIENT
Start: 2024-04-03 | End: 2024-04-03 | Stop reason: HOSPADM

## 2024-04-03 RX ORDER — KETAMINE HCL 50MG/ML(1)
SYRINGE (ML) INTRAVENOUS PRN
Status: DISCONTINUED | OUTPATIENT
Start: 2024-04-03 | End: 2024-04-03 | Stop reason: SDUPTHER

## 2024-04-03 RX ORDER — HALOPERIDOL 5 MG/ML
1 INJECTION INTRAMUSCULAR
Status: DISCONTINUED | OUTPATIENT
Start: 2024-04-03 | End: 2024-04-03 | Stop reason: HOSPADM

## 2024-04-03 RX ORDER — DEXAMETHASONE SODIUM PHOSPHATE 4 MG/ML
INJECTION, SOLUTION INTRA-ARTICULAR; INTRALESIONAL; INTRAMUSCULAR; INTRAVENOUS; SOFT TISSUE PRN
Status: DISCONTINUED | OUTPATIENT
Start: 2024-04-03 | End: 2024-04-03 | Stop reason: SDUPTHER

## 2024-04-03 RX ORDER — LIDOCAINE HYDROCHLORIDE 20 MG/ML
INJECTION, SOLUTION EPIDURAL; INFILTRATION; INTRACAUDAL; PERINEURAL PRN
Status: DISCONTINUED | OUTPATIENT
Start: 2024-04-03 | End: 2024-04-03 | Stop reason: SDUPTHER

## 2024-04-03 RX ORDER — LABETALOL HYDROCHLORIDE 5 MG/ML
5 INJECTION, SOLUTION INTRAVENOUS
Status: DISCONTINUED | OUTPATIENT
Start: 2024-04-03 | End: 2024-04-03 | Stop reason: HOSPADM

## 2024-04-03 RX ORDER — FENTANYL CITRATE 50 UG/ML
INJECTION, SOLUTION INTRAMUSCULAR; INTRAVENOUS PRN
Status: DISCONTINUED | OUTPATIENT
Start: 2024-04-03 | End: 2024-04-03 | Stop reason: SDUPTHER

## 2024-04-03 RX ORDER — MIDAZOLAM HYDROCHLORIDE 1 MG/ML
INJECTION INTRAMUSCULAR; INTRAVENOUS PRN
Status: DISCONTINUED | OUTPATIENT
Start: 2024-04-03 | End: 2024-04-03 | Stop reason: SDUPTHER

## 2024-04-03 RX ORDER — ACETAMINOPHEN 500 MG
1000 TABLET ORAL ONCE
Status: DISCONTINUED | OUTPATIENT
Start: 2024-04-03 | End: 2024-04-03 | Stop reason: HOSPADM

## 2024-04-03 RX ORDER — EPHEDRINE SULFATE/0.9% NACL/PF 25 MG/5 ML
SYRINGE (ML) INTRAVENOUS PRN
Status: DISCONTINUED | OUTPATIENT
Start: 2024-04-03 | End: 2024-04-03 | Stop reason: SDUPTHER

## 2024-04-03 RX ORDER — DEXTROSE MONOHYDRATE 100 MG/ML
INJECTION, SOLUTION INTRAVENOUS CONTINUOUS PRN
Status: DISCONTINUED | OUTPATIENT
Start: 2024-04-03 | End: 2024-04-03 | Stop reason: HOSPADM

## 2024-04-03 RX ORDER — OXYCODONE HYDROCHLORIDE 5 MG/1
5 TABLET ORAL
Status: DISCONTINUED | OUTPATIENT
Start: 2024-04-03 | End: 2024-04-03 | Stop reason: HOSPADM

## 2024-04-03 RX ORDER — HYDROCODONE BITARTRATE AND ACETAMINOPHEN 5; 325 MG/1; MG/1
1 TABLET ORAL EVERY 4 HOURS PRN
Qty: 15 TABLET | Refills: 0 | Status: SHIPPED | OUTPATIENT
Start: 2024-04-03 | End: 2024-04-08

## 2024-04-03 RX ORDER — SODIUM CHLORIDE 0.9 % (FLUSH) 0.9 %
5-40 SYRINGE (ML) INJECTION PRN
Status: DISCONTINUED | OUTPATIENT
Start: 2024-04-03 | End: 2024-04-03 | Stop reason: HOSPADM

## 2024-04-03 RX ORDER — DIPHENHYDRAMINE HYDROCHLORIDE 50 MG/ML
12.5 INJECTION INTRAMUSCULAR; INTRAVENOUS
Status: DISCONTINUED | OUTPATIENT
Start: 2024-04-03 | End: 2024-04-03 | Stop reason: HOSPADM

## 2024-04-03 RX ORDER — FENTANYL CITRATE 50 UG/ML
25 INJECTION, SOLUTION INTRAMUSCULAR; INTRAVENOUS EVERY 5 MIN PRN
Status: DISCONTINUED | OUTPATIENT
Start: 2024-04-03 | End: 2024-04-03 | Stop reason: HOSPADM

## 2024-04-03 RX ORDER — ACETAMINOPHEN 325 MG/1
650 TABLET ORAL
Status: DISCONTINUED | OUTPATIENT
Start: 2024-04-03 | End: 2024-04-03 | Stop reason: HOSPADM

## 2024-04-03 RX ADMIN — Medication 100 MCG: at 07:37

## 2024-04-03 RX ADMIN — Medication 25 MG: at 07:32

## 2024-04-03 RX ADMIN — EPHEDRINE SULFATE 5 MG: 5 INJECTION INTRAVENOUS at 07:45

## 2024-04-03 RX ADMIN — MIDAZOLAM 0.5 MG: 1 INJECTION, SOLUTION INTRAMUSCULAR; INTRAVENOUS at 08:14

## 2024-04-03 RX ADMIN — PROPOFOL 100 MG: 10 INJECTION, EMULSION INTRAVENOUS at 07:32

## 2024-04-03 RX ADMIN — SODIUM CHLORIDE 900 MG: 9 INJECTION, SOLUTION INTRAVENOUS at 07:27

## 2024-04-03 RX ADMIN — SODIUM CHLORIDE, POTASSIUM CHLORIDE, SODIUM LACTATE AND CALCIUM CHLORIDE: 600; 310; 30; 20 INJECTION, SOLUTION INTRAVENOUS at 06:41

## 2024-04-03 RX ADMIN — Medication 100 MCG: at 07:45

## 2024-04-03 RX ADMIN — FAMOTIDINE 20 MG: 20 TABLET ORAL at 06:42

## 2024-04-03 RX ADMIN — FENTANYL CITRATE 25 MCG: 50 INJECTION INTRAMUSCULAR; INTRAVENOUS at 07:50

## 2024-04-03 RX ADMIN — FENTANYL CITRATE 25 MCG: 50 INJECTION INTRAMUSCULAR; INTRAVENOUS at 07:32

## 2024-04-03 RX ADMIN — EPHEDRINE SULFATE 10 MG: 5 INJECTION INTRAVENOUS at 07:41

## 2024-04-03 RX ADMIN — MIDAZOLAM 0.5 MG: 1 INJECTION, SOLUTION INTRAMUSCULAR; INTRAVENOUS at 07:51

## 2024-04-03 RX ADMIN — DEXAMETHASONE SODIUM PHOSPHATE 4 MG: 4 INJECTION INTRA-ARTICULAR; INTRALESIONAL; INTRAMUSCULAR; INTRAVENOUS; SOFT TISSUE at 07:32

## 2024-04-03 RX ADMIN — MIDAZOLAM 1 MG: 1 INJECTION, SOLUTION INTRAMUSCULAR; INTRAVENOUS at 07:27

## 2024-04-03 RX ADMIN — Medication 25 MG: at 08:16

## 2024-04-03 RX ADMIN — GLYCOPYRROLATE 0.2 MG: 0.2 INJECTION, SOLUTION INTRAMUSCULAR; INTRAVENOUS at 07:27

## 2024-04-03 RX ADMIN — FENTANYL CITRATE 25 MCG: 50 INJECTION INTRAMUSCULAR; INTRAVENOUS at 08:04

## 2024-04-03 RX ADMIN — LIDOCAINE HYDROCHLORIDE 60 MG: 20 INJECTION, SOLUTION EPIDURAL; INFILTRATION; INTRACAUDAL; PERINEURAL at 07:32

## 2024-04-03 RX ADMIN — FENTANYL CITRATE 25 MCG: 50 INJECTION INTRAMUSCULAR; INTRAVENOUS at 08:14

## 2024-04-03 RX ADMIN — PROPOFOL 100 MCG/KG/MIN: 10 INJECTION, EMULSION INTRAVENOUS at 07:33

## 2024-04-03 ASSESSMENT — PAIN - FUNCTIONAL ASSESSMENT: PAIN_FUNCTIONAL_ASSESSMENT: 0-10

## 2024-04-03 NOTE — DISCHARGE INSTRUCTIONS
Keep dressings dry, clean, intact. Weightbear as tolerated in surgical shoe. Ice and elevate left foot as instructed. Pain control prn with hydrocodone. Finish course of antibiotics as prescribed.         DISCHARGE SUMMARY from Nurse    PATIENT INSTRUCTIONS:    After general anesthesia or intravenous sedation, for 24 hours or while taking prescription Narcotics:  Limit your activities  Do not drive and operate hazardous machinery  Do not make important personal or business decisions  Do  not drink alcoholic beverages  If you have not urinated within 8 hours after discharge, please contact your surgeon on call.    Report the following to your surgeon:  Excessive pain, swelling, redness or odor of or around the surgical area  Temperature over 100.5  Nausea and vomiting lasting longer than 4 hours or if unable to take medications  Any signs of decreased circulation or nerve impairment to extremity: change in color, persistent  numbness, tingling, coldness or increase pain  Any questions        These are general instructions for a healthy lifestyle:    No smoking/ No tobacco products/ Avoid exposure to second hand smoke  Surgeon General's Warning:  Quitting smoking now greatly reduces serious risk to your health.    Obesity, smoking, and sedentary lifestyle greatly increases your risk for illness    A healthy diet, regular physical exercise & weight monitoring are important for maintaining a healthy lifestyle    You may be retaining fluid if you have a history of heart failure or if you experience any of the following symptoms:  Weight gain of 3 pounds or more overnight or 5 pounds in a week, increased swelling in our hands or feet or shortness of breath while lying flat in bed.  Please call your doctor as soon as you notice any of these symptoms; do not wait until your next office visit.        The discharge information has been reviewed with the patient and spouse.  The patient and spouse verbalized

## 2024-04-03 NOTE — PERIOP NOTE
Patient /Family /Designee has been informed that [facility] is not responsible for patient belongings per policy and the signed [facility] Patient Agreement document.  Personal items should be sent home or checked in with security.  Patient /Family /Designee selected the following action:                            [x]  Send personal items home with a family member or friend                                                 []  Check in personal items with security, excluding clothing                            []  Maintain personal items at the bedside, against recommendation                                 by Matteo Lowry [facility]                                   ** If patient /family /designee chooses to maintain personal items at the bedside,                                      Complete the patient belongings inventory in the EMR.

## 2024-04-03 NOTE — BRIEF OP NOTE
Brief Postoperative Note      Patient: Karma Downs  YOB: 1975  MRN: [id number]    Date of Procedure: 4/3/2024    Pre-Op Diagnosis Codes:     * Retained orthopedic hardware [Z96.9]     * Left foot pain [M79.672]    Post-Op Diagnosis: Same       Procedure(s):  LEFT FOOT REMOVAL OF HARDWARE; C-ARM; *MAC/LOCAL*    Surgeon(s):  Herrera Valenzuela DPM    Assistant:  Surgical Assistant: Shyam Huang    Anesthesia: Monitor Anesthesia Care    Estimated Blood Loss (mL): Minimal    Complications: None    Specimens:   * No specimens in log *    Implants:  Implant Name Type Inv. Item Serial No.  Lot No. LRB No. Used Action   ALLOGRAFT BNE PTTY 5 CC CUST ALLM[employer]X - [id number]  ALLOGRAFT BNE PTTY 5 CC CUST ALLMTRX  [employer] [facility] [id number] Left 1 Implanted         Drains: * No LDAs found *    Findings:  Infection Present At Time Of Surgery (PATOS) (choose all levels that have infection present):  No infection present  Other Findings: As dictated in Op note    Electronically signed by Herrera Valenzuela DPM on 4/3/2024 at 9:06 AM

## 2024-04-03 NOTE — H&P
Update History & Physical    The patient's History and Physical of PCP was reviewed with the patient and I examined the patient. There was no change. The surgical site was confirmed by the patient and me.       Plan: The risks, benefits, expected outcome, and alternative to the recommended procedure have been discussed with the patient. Patient understands and wants to proceed with the procedure.     Electronically signed by Herrera Valenzuela DPM on 4/3/2024 at 7:20 AM

## 2024-04-03 NOTE — ANESTHESIA POSTPROCEDURE EVALUATION
Department of Anesthesiology  Postprocedure Note    Patient: Karma Downs  MRN: [id number]  YOB: 1975  Date of evaluation: 4/3/2024    Procedure Summary       Date: 04/03/24 Room / Location: [facility] MAIN 01 / [facility] MAIN OR    Anesthesia Start: 0727 Anesthesia Stop: 0920    Procedure: LEFT FOOT REMOVAL OF HARDWARE; C-ARM; *MAC/LOCAL* (Left: Foot) Diagnosis:       Retained orthopedic hardware      Left foot pain      (Retained orthopedic hardware [Z96.9])      (Left foot pain [M79.672])    Surgeons: Herrera Valenzuela DPM Responsible Provider: Kash Oseguera MD    Anesthesia Type: MAC ASA Status: 3            Anesthesia Type: MAC    Mauro Phase I: Mauro Score: 10    Mauro Phase II: Mauro Score: 10    Anesthesia Post Evaluation    Patient location during evaluation: PACU  Patient participation: complete - patient participated  Level of consciousness: awake and alert  Airway patency: patent  Nausea & Vomiting: no nausea  Cardiovascular status: blood pressure returned to baseline  Respiratory status: acceptable  Hydration status: euvolemic  Pain management: adequate    No notable events documented.

## 2024-04-03 NOTE — ANESTHESIA PRE PROCEDURE
Cardiovascular:  Exercise tolerance: good (>4 METS)          Rhythm: regular  Rate: normal                 ROS comment: Hx of PFO percutaneous closure. No further issues.       Neuro/Psych:   (+) TIA, headaches:, psychiatric history:            GI/Hepatic/Renal:   (+) GERD:, morbid obesity          Endo/Other:    (+) : arthritis:..                 Abdominal: normal exam            Vascular:          Other Findings:             Anesthesia Plan      MAC and general     ASA 3       Induction: intravenous.    MIPS: Postoperative opioids intended and Prophylactic antiemetics administered.  Anesthetic plan and risks discussed with patient.                    Kash Oseguera MD   4/3/2024

## 2024-04-04 NOTE — OP NOTE
Operative Note      Patient: Karma Downs  YOB: 1975  MRN: [id number]    Date of Procedure: 4/3/2024    Pre-Op Diagnosis Codes:     * Retained orthopedic hardware [Z96.9]     * Left foot pain [M79.672]    Post-Op Diagnosis: Same       Procedure(s):  LEFT FOOT REMOVAL OF HARDWARE; C-ARM; *MAC/LOCAL*    Surgeon(s):  Herrera Valenzuela DPM    Assistant:   Surgical Assistant: Shyam Huang    Anesthesia: Monitor Anesthesia Care with local    Estimated Blood Loss (mL): Minimal    Complications: None    Specimens:   * No specimens in log *    Implants:  Implant Name Type Inv. Item Serial No.  Lot No. LRB No. Used Action   ALLOGRAFT BNE PTTY 5 CC CUST ALLM[employer] - [id number]  ALLOGRAFT BNE PTTY 5 CC CUST ALLMTRX  [employer] [facility]- [id number] Left 1 Implanted         Drains: * No LDAs found *    Findings:  Infection Present At Time Of Surgery (PATOS) (choose all levels that have infection present):  No infection present  Other Findings: As noted below    Indications for procedure: Patient is a 47 y/o female seen in office for painful, palpable hardware in left first metatarsal head. Patient previously had bunion surgery and she now feels screw head dorsally with wearing shoe gear. She is requesting to remove painful hardware. All risks, benefits, complications of procedure discussed in detail with patient. No guarantee made to outcome of procedure. Patient voiced verbal understanding and signed consent.     Detailed Description of Procedure: Patient brought into operating room and placed on operating table in supine position. After IV sedation from department of anesthesia local block consisting of 10 cc of 1:1 mixture of 1% lidocaine plain and 0.5% marcaine plain administered in alexander block fashion. Ankle tourniquet applied but not yet inflated. Left foot and ankle prepped and draped in usual sterile fashion. Esmarch bandage utilized to exsanguinate left foot and ankle tourniquet inflated

## 2025-02-04 ENCOUNTER — HOSPITAL ENCOUNTER (OUTPATIENT)
Facility: HOSPITAL | Age: 50
Discharge: HOME | End: 2025-02-07
Payer: OTHER GOVERNMENT

## 2025-02-04 DIAGNOSIS — R92.8 OTHER ABNORMAL AND INCONCLUSIVE FINDINGS ON DIAGNOSTIC IMAGING OF BREAST: ICD-10-CM

## 2025-02-04 PROCEDURE — C8908 MRI W/O FOL W/CONT, BREAST,: HCPCS

## 2025-02-04 PROCEDURE — 6360000004 HC RX CONTRAST MEDICATION: Performed by: INTERNAL MEDICINE

## 2025-02-04 PROCEDURE — A9577 INJ MULTIHANCE: HCPCS | Performed by: INTERNAL MEDICINE

## 2025-02-04 RX ADMIN — GADOBENATE DIMEGLUMINE 20 ML: 529 INJECTION, SOLUTION INTRAVENOUS at 11:16

## 2025-06-10 ENCOUNTER — OFFICE VISIT (OUTPATIENT)
Age: 50
End: 2025-06-10

## 2025-06-10 VITALS — WEIGHT: 207.8 LBS | BODY MASS INDEX: 40.58 KG/M2

## 2025-06-10 DIAGNOSIS — M17.12 PRIMARY OSTEOARTHRITIS OF LEFT KNEE: Primary | ICD-10-CM

## 2025-06-10 DIAGNOSIS — M17.11 PRIMARY OSTEOARTHRITIS OF RIGHT KNEE: ICD-10-CM

## 2025-06-10 RX ORDER — LIDOCAINE HYDROCHLORIDE 10 MG/ML
4 INJECTION, SOLUTION INFILTRATION; PERINEURAL ONCE
Status: COMPLETED | OUTPATIENT
Start: 2025-06-10 | End: 2025-06-10

## 2025-06-10 RX ADMIN — LIDOCAINE HYDROCHLORIDE 4 ML: 10 INJECTION, SOLUTION INFILTRATION; PERINEURAL at 10:47

## 2025-06-10 NOTE — PROGRESS NOTES
Past Medical History:   Diagnosis Date    Arthritis     Bilateral tinnitus     Chronic pain     GERD (gastroesophageal reflux disease)     Migraine     ALEX on CPAP     PTSD (post-traumatic stress disorder)     S/P patent foramen ovale closure 2010       No family history on file.    Current Outpatient Medications   Medication Sig Dispense Refill    pregabalin (LYRICA) 50 MG capsule Take 1 capsule by mouth 2 times daily.      ZOLMitriptan (ZOMIG-ZMT) 5 MG disintegrating tablet Take 1 tablet by mouth daily      famotidine (PEPCID) 20 MG tablet Take 2 tablets by mouth nightly      fluticasone (FLONASE) 50 MCG/ACT nasal spray 2 sprays by Nasal route daily      vitamin D (CHOLECALCIFEROL) 25 MCG (1000 UT) TABS tablet Take 3 tablets by mouth daily      REFRESH TEARS 0.5 % SOLN ophthalmic solution Place 2 drops into both eyes 2 times daily      cycloSPORINE (RESTASIS) 0.05 % ophthalmic emulsion Place 1 drop into both eyes in the morning and 1 drop in the evening.      meloxicam (MOBIC) 15 MG tablet Take 1 tablet by mouth daily      aspirin 81 MG chewable tablet Take 1 tablet by mouth daily      atorvastatin (LIPITOR) 40 MG tablet Take 1 tablet by mouth daily      azelastine (ASTELIN) 0.1 % nasal spray 2 sprays by Nasal route 2 times daily      betamethasone valerate (VALISONE) 0.1 % ointment APPLY A SMALL AMOUNT TO AFFECTED AREA 2 TIMES A DAY AS NEEDED FOR RASH ON LEGS, ARM, OR TORSO; NO FACE USE      clotrimazole (LOTRIMIN) 1 % cream APPLY A SMALL AMOUNT TO AFFECTED AREA 2 TIMES A DAY FUNGAL RASH UNDER BREASTS      diclofenac sodium (VOLTAREN) 1 % GEL APPLY FOUR GRAMS TO INTACT SKIN OF PAINFUL AFFECTED AREA FOUR TIMES A DAY ** PLEASE USE DICLOFENAC DOSING CARD FOR ADMINISTRATION** TO PAINFUL JOINT/BACK      DULoxetine (CYMBALTA) 60 MG extended release capsule Take 1 capsule by mouth 2 times daily      hydrOXYzine HCl (ATARAX) 25 MG tablet Take 1 tablet by mouth 3 times daily      montelukast (SINGULAIR) 10 MG

## 2025-06-10 NOTE — ASSESSMENT & PLAN NOTE
After obtaining written consent for bilateral knee intra-articular injection the patient was prepped in normal sterile fashion with freeze spray and alcohol.  2mL Euflexxa (1% sodium Hyaluronate) was injected with ultrasound guidance, with permanent recording and reporting.  The needle was withdrawn, the area was cleansed and a sterile bandage was applied.  The patient tolerated the procedure well.

## 2025-06-16 ENCOUNTER — OFFICE VISIT (OUTPATIENT)
Age: 50
End: 2025-06-16

## 2025-06-16 VITALS — WEIGHT: 210.4 LBS | BODY MASS INDEX: 41.09 KG/M2

## 2025-06-16 DIAGNOSIS — M17.12 PRIMARY OSTEOARTHRITIS OF LEFT KNEE: Primary | ICD-10-CM

## 2025-06-16 DIAGNOSIS — M17.11 PRIMARY OSTEOARTHRITIS OF RIGHT KNEE: ICD-10-CM

## 2025-06-16 RX ORDER — LIDOCAINE HYDROCHLORIDE 10 MG/ML
4 INJECTION, SOLUTION INFILTRATION; PERINEURAL ONCE
Status: COMPLETED | OUTPATIENT
Start: 2025-06-16 | End: 2025-06-16

## 2025-06-16 RX ADMIN — LIDOCAINE HYDROCHLORIDE 4 ML: 10 INJECTION, SOLUTION INFILTRATION; PERINEURAL at 10:11

## 2025-06-16 NOTE — PROGRESS NOTES
Patient: Karma Downs                MRN: [id number]       SSN: [id number]  YOB: 1975        AGE: 49 y.o.        SEX: female  BMI: Body mass index is 41.09 kg/m².    PCP: Vani Yee MD  06/16/25    Chief Complaint: Injections (#2 Bilateral knee Euflexxa )      1. Primary osteoarthritis of left knee  -     AMB POC US DRAIN/INJECT LARGE JOINT/BURSA  -     lidocaine 1 % injection 4 mL; 4 mL, Intra-artICUlar, ONCE, 1 dose, On Mon 6/16/25 at 0915  -     sodium hyaluronate (EUFLEXXA, HYALGAN) injection 20 mg; 20 mg, Intra-artICUlar, ONCE, 1 dose, [facility] Euf[facility]xa  2. Primary osteoarthritis of right knee  Assessment & Plan:  After obtaining written consent for bilateral knee intra-articular injection the patient was prepped in normal sterile fashion with freeze spray and alcohol.  2mL Euflexxa (1% sodium Hyaluronate) was injected with ultrasound guidance.  The needle was withdrawn, the area was cleansed and a sterile bandage was applied.  The patient tolerated the procedure well.       Orders:  -     AMB POC US DRAIN/INJECT LARGE JOINT/BURSA  -     sodium hyaluronate (EUFLEXXA, HYALGAN) injection 20 mg; 20 mg, Intra-artICUlar, ONCE, 1 dose, On Mon 6/16/25 [facility] 0915[facility]? Euflexxa  -     lidocaine 1 % injection 4 mL; 4 mL, Intra-artICUlar, ONCE, 1 dose, On Mon 6/16/25 at 0915          HPI:  Karma Downs is a 49 y.o. female with chief complaint of   Chief Complaint   Patient presents with    Injections     #2 Bilateral knee Euflexxa      -6/26/2025: Bilateral knee Euflexxa series  -11/13/2023: Bilateral knee Euflexxa series, excellent relief for over 5 months    Bilateral knee pain, hip pain and back pain with sciatica she has had back injections with transient relief of her back and sciatic symptoms but without help to her hip or knees.  She has had bursa injections to both hips which did help but not helped her knee pain.

## 2025-06-23 ENCOUNTER — OFFICE VISIT (OUTPATIENT)
Age: 50
End: 2025-06-23
Payer: OTHER GOVERNMENT

## 2025-06-23 VITALS — WEIGHT: 210 LBS | BODY MASS INDEX: 41.01 KG/M2

## 2025-06-23 DIAGNOSIS — M17.11 PRIMARY OSTEOARTHRITIS OF RIGHT KNEE: ICD-10-CM

## 2025-06-23 DIAGNOSIS — M17.12 PRIMARY OSTEOARTHRITIS OF LEFT KNEE: Primary | ICD-10-CM

## 2025-06-23 PROCEDURE — 20611 DRAIN/INJ JOINT/BURSA W/US: CPT | Performed by: ORTHOPAEDIC SURGERY

## 2025-06-23 RX ORDER — LIDOCAINE HYDROCHLORIDE 10 MG/ML
2 INJECTION, SOLUTION INFILTRATION; PERINEURAL ONCE
Status: COMPLETED | OUTPATIENT
Start: 2025-06-23 | End: 2025-06-23

## 2025-06-23 RX ADMIN — LIDOCAINE HYDROCHLORIDE 2 ML: 10 INJECTION, SOLUTION INFILTRATION; PERINEURAL at 09:25

## 2025-06-23 NOTE — PROGRESS NOTES
Patient: Karma Downs                MRN: [id number]       SSN: [id number]  YOB: 1975        AGE: 49 y.o.        SEX: female  BMI: Body mass index is 41.01 kg/m².    PCP: Vani Yee MD  06/23/25    Chief Complaint: Knee Pain (Armando knee)      1. Primary osteoarthritis of left knee  Assessment & Plan:   After obtaining written consent for bilateral knee intra-articular injection the patient was prepped in normal sterile fashion with freeze spray and alcohol.  2mL Euflexxa (1% sodium Hyaluronate) was injected with ultrasound guidance, with permanent recording and reporting.  The needle was withdrawn, the area was cleansed and a sterile bandage was applied.  The patient tolerated the procedure well.    Orders:  -     AMB POC US DRAIN/INJECT LARGE JOINT/BURSA  -     lidocaine 1 % injection 2 mL; 2 mL, Intra-artICUlar, ONCE, 1 dose, On Mon 6/23/25 at 0845  -     sodium hyaluronate (EUFLEXXA, HYALGAN) injection 20 mg; 20 mg, Intra-artICUlar, ONCE, 1 dose, [facility]/25 [facility] Euflexxa  2. Primary osteoarthritis of right knee  -     AMB POC US DRAIN/INJECT LARGE JOINT/BURSA  -     sodium hyaluronate (EUFLEXXA, HYALGAN) injection 20 mg; 20 mg, Intra-artICUlar, ONCE, 1 dose, On Mon 6/23/25 at 0845[facility]? Euflexxa  -     lidocaine 1 % injection 2 mL; 2 mL, Intra-artICUlar, ONCE, 1 dose, On Mon 6/23/25 at 0845            HPI:  Karma Downs is a 49 y.o. female with chief complaint of   Chief Complaint   Patient presents with    Knee Pain     Armando knee     -6/23/2025: Bilateral knee Euflexxa series  -11/13/2023: Bilateral knee Euflexxa series, excellent relief for over 5 months    Bilateral knee pain, hip pain and back pain with sciatica she has had back injections with transient relief of her back and sciatic symptoms but without help to her hip or knees.  She has had bursa injections to both hips which did help but not helped her knee pain.

## (undated) DEVICE — SUTURE NONABSORBABLE BRAIDED 2-0 CT-2 1X30 IN ETHBND EXCEL X411H

## (undated) DEVICE — SHEET,DRAPE,70X100,STERILE: Brand: MEDLINE

## (undated) DEVICE — BANDAGE,GAUZE,BULKEE LITE,3"X4.1YD,STRL: Brand: MEDLINE

## (undated) DEVICE — 2.1MM X 19.6MM METAL CUTTING HELIOCOIDAL RASP

## (undated) DEVICE — SUTURE VCRL SZ 3-0 L18IN ABSRB VLT L26MM SH 1/2 CIR J774D

## (undated) DEVICE — DRAPE,U/SHT,SPLIT,FILM,60X84,STERILE: Brand: MEDLINE

## (undated) DEVICE — SOLUTION IRRIG 3000ML 0.9% SOD CHL FLX CONT 0797208] ICU MEDICAL INC]

## (undated) DEVICE — SUTURE ETHLN SZ 3-0 L18IN NONABSORBABLE BLK L19MM PC-5 3/8 1893G

## (undated) DEVICE — GAUZE,SPONGE,4"X4",16PLY,STRL,LF,10/TRAY: Brand: MEDLINE

## (undated) DEVICE — DRAPE C ARM UNIV W41XL74IN CLR PLAS XR VELC CLSR POLY STRP

## (undated) DEVICE — Device

## (undated) DEVICE — SUTURE VCRL SZ 0 L18IN ABSRB VLT L26MM CT-2 1/2 CIR J727D

## (undated) DEVICE — APPLICATOR MEDICATED 26 CC SOLUTION HI LT ORNG CHLORAPREP

## (undated) DEVICE — WRAP COMPR W4INXL5YD NONSTERILE TAN SELF ADH COBAN

## (undated) DEVICE — UNTHREADED GUIDE WIRE: Brand: FIXOS

## (undated) DEVICE — PADDING CAST W6XL144IN WHT COT SYN RL NONADHESIVE SOF-ROL

## (undated) DEVICE — SOLUTION IRRIG 1000ML 0.9% SOD CHL USP POUR PLAS BTL

## (undated) DEVICE — SOLUTION SCRB 4OZ 4% CHG CLN BASE FOR PT SKIN ANTISEPSIS

## (undated) DEVICE — ELECTRODE PT RET AD L9FT HI MOIST COND ADH HYDRGEL CORDED

## (undated) DEVICE — PREMIUM DRY TRAY LF: Brand: MEDLINE INDUSTRIES, INC.

## (undated) DEVICE — ZIMMER® STERILE DISPOSABLE TOURNIQUET CUFF WITH PROTECTIVE SLEEVE AND PLC, DUAL PORT, SINGLE BLADDER, 18 IN. (46 CM)

## (undated) DEVICE — THREE-QUARTER SHEET: Brand: CONVERTORS

## (undated) DEVICE — INTENDED FOR TISSUE SEPARATION, AND OTHER PROCEDURES THAT REQUIRE A SHARP SURGICAL BLADE TO PUNCTURE OR CUT.: Brand: BARD-PARKER SAFETY BLADES SIZE 10, STERILE

## (undated) DEVICE — STOCKINETTE,IMPERVIOUS,12X48,STERILE: Brand: MEDLINE

## (undated) DEVICE — DRAPE,EXTREMITY,89X128,STERILE: Brand: MEDLINE

## (undated) DEVICE — EXTRACTOR

## (undated) DEVICE — BANDAGE COMPR EXSANGUATION SGL LAYERED NO CLSR 9FT LEN 4IN W

## (undated) DEVICE — PACK PROCEDURE SURG MAJ W/ BASIN LF

## (undated) DEVICE — INTENDED FOR TISSUE SEPARATION, AND OTHER PROCEDURES THAT REQUIRE A SHARP SURGICAL BLADE TO PUNCTURE OR CUT.: Brand: BARD-PARKER SAFETY BLADES SIZE 15, STERILE

## (undated) DEVICE — BLADE CLIPPER GEN PURP NS

## (undated) DEVICE — PADDING CAST W4XL144IN WHT COT SYN RL NONADHESIVE SOF-ROL

## (undated) DEVICE — DRESSING,GAUZE,XEROFORM,CURAD,1"X8",ST: Brand: CURAD

## (undated) DEVICE — HANDPIECE SET WITH HIGH FLOW TIP AND SUCTION TUBE: Brand: INTERPULSE

## (undated) DEVICE — SOLUTION IRRIG 1000ML H2O STRL BLT

## (undated) DEVICE — BANDAGE COBAN 4 IN COMPR W4INXL5YD FOAM COHESIVE QUIK STK SELF ADH SFT

## (undated) DEVICE — KIT OR TURNOVER